# Patient Record
Sex: FEMALE | Race: WHITE | Employment: OTHER | URBAN - METROPOLITAN AREA
[De-identification: names, ages, dates, MRNs, and addresses within clinical notes are randomized per-mention and may not be internally consistent; named-entity substitution may affect disease eponyms.]

---

## 2021-08-31 ENCOUNTER — TRANSFERRED RECORDS (OUTPATIENT)
Dept: HEALTH INFORMATION MANAGEMENT | Facility: CLINIC | Age: 76
End: 2021-08-31

## 2022-01-17 ENCOUNTER — TRANSFERRED RECORDS (OUTPATIENT)
Dept: HEALTH INFORMATION MANAGEMENT | Facility: CLINIC | Age: 77
End: 2022-01-17
Payer: COMMERCIAL

## 2022-02-01 ENCOUNTER — TRANSFERRED RECORDS (OUTPATIENT)
Dept: HEALTH INFORMATION MANAGEMENT | Facility: CLINIC | Age: 77
End: 2022-02-01
Payer: COMMERCIAL

## 2022-02-03 ENCOUNTER — TELEPHONE (OUTPATIENT)
Dept: HEMATOLOGY | Facility: CLINIC | Age: 77
End: 2022-02-03
Payer: COMMERCIAL

## 2022-02-03 NOTE — CONFIDENTIAL NOTE
RN received a phone call from Eva's daughter Shereen. Per Shereen Eva had a stroke in 2017 and has since has many TIA's. In the last two weeks she was in the ER 2x. She was recently diagnosed with a BRVO and told to follow up with hematology for a hypercoagulable workup. Of note she is not currently on a blood thinner and she has hx of a Fib (has a watchman device).    RN unable to see these records in her chart or through care everywhere. RN will ask fellow nursing staff to reach out to opthamology office for records. Once records are obtained we will reach out to Eva to schedule.     Penelope GOLDBERG, RN, PHN  [facility]  [facility]  Office: [phone]  Fax: [phone]

## 2022-02-07 ENCOUNTER — MEDICAL CORRESPONDENCE (OUTPATIENT)
Dept: HEALTH INFORMATION MANAGEMENT | Facility: CLINIC | Age: 77
End: 2022-02-07
Payer: COMMERCIAL

## 2022-02-17 ENCOUNTER — TELEPHONE (OUTPATIENT)
Dept: HEMATOLOGY | Facility: CLINIC | Age: 77
End: 2022-02-17
Payer: COMMERCIAL

## 2022-02-23 NOTE — PROGRESS NOTES
"      [facility]  [address] [place] [address] [address], [place] [address]  Main: [phone], Fax: [phone]    Patient seen at: [facility] at [address] [place] [address]    Video Virtual Visit Note:    Patient: Eva Mosqueda  MRN: [id number]  : 1945  LUCY: 2022    Due to the ongoing COVID-19 outbreak, this visit was conducted by video, with the patient's approval.      Daughter, Shereen, was also on the video call     Reason for Consultation:  Eva Mosqueda is a referred by Amina Sherman PA-C for \"evaluation of clotting disorder, should she continue aspirin or other blood thinner?\"     Assessment:  In summary, Eva Mosqueda is a 77 year old man with a history of hypertension, cervical spinal fusion, hypothyroidism, varicosities, paroxysmal atrial fibrillation, intracranial hemorrhage on Xarelto, s/p Watchman, amyloid angiopathy with recurrent subarachnoid hemorrhages. She also has had recurrent TIA like episodes that were felt to be related to seizure like activity in setting of microhemorrhages secondary to her amyloid angiopathy. More recently she has been evaluated by her ophthalmologist for vision changes of the right eye with papilledema that was concerning for possible BRVO. She was placed back on aspirin. She has no personal or family history of venous or arterial thromboembolism. Her bleeding episodes of epistaxis, hemorrhoidal bleeding, ICH occurred in the setting of anticoagulation and antiplatelet use. She does have amyloid angiopathy and has had recurrent subarachnoid hemorrhages despite being off aspirin. She is currently back on aspirin and tolerating it well with reduction in her ER visits for TIA like symptoms. She is being seen by a retinal specialist for possible BRVO and reported retinal hemorrhage.     Plan:  Majority of today's visit was spent counseling the patient on the risks and benefits of antiplatelet and " anticoagulant use given her complex history.  She has no personal history of prior venous of arterial thromboembolism and has low pretest probability of a genetic thrombophilia. I do not recommend evaluation for these as she is not a candidate for full anticoagulation given her bleeding history and amyloid angiopathy. Her INR/PTT are within normal range. She does have strong evidence of risk factors for BRVO including hypertension and dyslipidemia. We discussed ongoing risk factor modification for these. I will defer the recommendation for continuation of aspirin for possible BRVO to her retinal specialist. It is reasonable to continue baby aspirin as long as the benefits are felt to outweigh the bleeding risks. This should be re-evaluated on an ongoing basis by her ophthalmologist, retinal specialist and neurologist.      The patient is given our center's contact information and is instructed to call if she should have any further questions or concerns.      Patient understands and agrees with the above plan and recommendation.      Gregoria Estevez, MPH, PA-C  [facility]    Video-Visit Details:  Type of service:  Video Visit  Video Start Time: 1500  Video End Time (time video stopped): 1525  Originating Location (pt. Location): Home  Distant Location (provider location):  [facility]   Mode of Communication:  Video Conference via [email or web address]    60 minutes spent on the date of the encounter doing chart review, review of outside records, review of test results, interpretation of tests, patient visit and documentation.      ----------------------------------------------------------------------------------------------------------------------  History of Present Illness:  Eva Mosqueda is a 77 year old woman with a history of hypertension, cervical spinal fusion, hypothyroidism, varicosities, paroxysmal  atrial fibrillation, bradycardia, intracranial hemorrhage on Xarelto, amyloid angiopathy with recurrent subarachnoid hemorrhages. She also has had recurrent TIA like episodes that were felt to be related to seizure like activity in setting of microhemorrhages secondary to her amyloid angiopathy. More recently she has been evaluated by her ophthalmologist for vision changes of the right eye with papilledema that was concerning for possible BRVO. She is referred by her primary care provider to assess need for coagulation evaluation and whether she should continue aspirin or other anticoagulant given her possible BRVO.    Ms. Mosqueda has a history of intracranial hemorrhage in right temporal region in setting of encephalomalacia and while on Xarelto in 2/2017. She had been changed from Eliquis to Xarelto just prior to the event due to insurance coverage. She was on anticoagulation for stroke prophylaxis given her atrial fibrillation. Once stabilized, she was placed back on Eliquis. Her follow up head MRI showed multiple areas of bleeding concerning for amyloid angiopathy per her neurologist, Dr. Ann. She was taken off her Eliquis and started on full dose aspirin in 4/2017. She did undergo Watchman implant in 4/2017 at [facility] and was on aspirin and plavix for 6 months followed by full dose aspirin only. In 2017, she had ER visit for epistaxis and hemoptysis on aspirin. In 6/2019, she was seen at the ER for TIA like episode of left arm and perioral paresthesias that resolved within an hour. She was seen by neurology in follow up and changed from full dose aspirin to Plavix. She did have some rectal bleeding on her plavix in 6/2019 and 12/2019 which was felt to be hemorrhoidal.      On 5/5/2021, she was seen for right hand numbness, tongue numbness and dysarthria. MRI at that time showed new left frontoparietal bleeding. Her plavix was discontinued. She was seen again on 5/8/2021 for recurrent symptoms. She was seen by  "neurology and was started on Keppra for concern of focal seizures causing her symptoms. She was tapered off Keppra after no symptomatic improvement was noted. She had repeat ER visits on 6/7/2021 and 6/8/2021 for similar symptoms. MRI showed chronic micro-hemorrhages consistent with amyloid angiopathy. Her symptoms resolved quickly and thus was discharged to home.     On 1/17/2022, she was seen by the ophthalmologist for right eye vision changes. She was found to have papilledema in right eye > left eye concerning for possible vascular cause. Per the ophthalmologist notes - \" could be impending CRVO or ischemic optic neuropathy vs other causes\". She was sent to the ER to rule out giant cell arteritis. Her ESR and CRP were normal. MRA showed no evidence of CNS vasculitis. She was incidentally found to have a meningioma without mass effect. Her ophthalmologist had recommended retinal imaging however the patient has severe contrast allergy and is unable to have that imaging.     On 1/24/2022, she called EMS for headache, slurred speech and word finding. Her speech deficit symptoms resolved while en route to the hospital. She had repeat brain MRI showing no acute findings. Neurology was consulted and recommended restarting Keppra however the patient declined. Headaches were attributed to cervical spine degenerative disease.     She met with her neurologist, Dr. Ann on 2/7/2022 in follow up who was agreeable to continue her on aspirin 81mg once daily despite higher bleeding risk in setting of amyloid angiopathy.     She recently established care with Dr. Rossi, retinal specialist, for her possible BRVO. She notes that she was also diagnosed with retinal hemorrhage and has been receiving injection therapy.  She was started on gabapentin for her headaches which has helped. She has not had any recent visits to the ER with any TIA-like symptoms.     She is currently on aspirin 81mg once daily and having some mild " ecchymosis but no significant bleeding. She has no history of significant bleeding events not related to being on anticoagulation or antiplatelet therapy. She has no family history of bleeding diathesis.     She does have a history of varicosities and an episode of superficial thrombophlebitis in 2015. She has no other history of venous or arterial thrombotic events.  No family history of venous thromboembolism. She does have a history of hypertension and takes lisinopril. She is on atorvastatin for dyslipidemia. She recently had MRA of the neck that showed mild atherosclerosis without stenosis.       Past Medical History:  Past Medical History:   Diagnosis Date     Arthritis      Hypertension      Irregular heart beat     afib 2003 now NSR     Numbness and tingling     down arms into hands-jack     Restless legs syndrome (RLS)      Thyroid disease        Past Surgical History:  Past Surgical History:   Procedure Laterality Date     BACK SURGERY      2 separate lumbar spine surgeries     ENT SURGERY      vocal cords     FUSION CERVICAL ANTERIOR TWO LEVELS  7/10/2012    Procedure: FUSION CERVICAL ANTERIOR TWO LEVELS;  C6 CORPECTOMY, C5-C7 ANTERIOR CERVICAL FUSION WITH PROSTHETIC INTERVERTEBRAL SPACER, LOCAL AUTOGRAFT AND INFUSE (PSR SPACER, ATLANTIS T, MIDAS AM8);  Surgeon: Antoine Corea MD;  Location:  OR       Medications:  Current Outpatient Medications   Medication Sig Dispense Refill     ASPIRIN PO Take 325 mg by mouth daily.         atorvastatin (LIPITOR) 10 MG tablet Take 10 mg by mouth daily       citalopram (CELEXA) 20 MG tablet Take 20 mg by mouth daily       gabapentin (NEURONTIN) 300 MG capsule Take 300 mg by mouth 2 times daily       LEVOTHYROXINE SODIUM PO Take 75 mcg by mouth daily.         lisinopril (ZESTRIL) 5 MG tablet Take 5 mg by mouth daily       METOPROLOL SUCCINATE PO Take 12.5 mg by mouth daily.         Pramipexole Dihydrochloride (MIRAPEX PO) Take 0.125 mg by mouth At Bedtime.          calcium carbonate-vitamin D (CALCIUM + D) 600-200 MG-UNIT TABS Take 2 tablets by mouth daily.         cholecalciferol (VITAMIN D) 1000 UNIT tablet Take 2 tablets by mouth daily. 100 tablet 3     FLECAINIDE ACETATE PO Take 50 mg by mouth 2 times daily.         Multiple Vitamin (MULTI-VITAMIN) per tablet Take 1 tablet by mouth daily.         Omega-3 Fatty Acids (OMEGA-3 FISH OIL PO) Take 1 g by mouth daily.         oxyCODONE-acetaminophen (PERCOCET) 5-325 MG per tablet Take 1 tablet by mouth every 4 hours as needed. (Patient not taking: Reported on 2022) 30 tablet           Allergies:  Allergies   Allergen Reactions     Contrast Dye Anaphylaxis       ROS:  Remainder of a comprehensive 14 point ROS is negative unless noted above.    Social History:  Patient is retired  Former tobacco use, quit officially in      Family History:  No family history of bleeding or clotting disorders.       Objective:  Pleasant in no acute distress.  Visual Examination via Video:      Labs:          Imagin/15/2022 MRA Cervical vessels  No change since May 2021. Mild atherosclerotic irregularity along the proximal internal carotid arteries without associated stenosis. Patent vertebral arteries with antegrade flow direction.        2022 US carotids    1.  Tortuous cervical internal carotid arteries, bilaterally.   2.  Velocities and ratios suggest moderate, 50-69% stenoses of the internal carotid arteries, bilaterally. However, the severity of the stenoses is likely exaggerated due to the tortuosity of the cervical segments.       2022 MRI Brain W/O  1. No acute intracranial abnormality evident.   2. Small right anterior right parafalcine meningioma unchanged.   3. 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 Multiple peripheral foci of chronic microhemorrhage suggest the possibility of underlying amyloid angiopathy as a group unchanged.   4. Focal area of chronic encephalomalacia and  gliosis in the right posterior temporal lobe unchanged.   5. Mild diffuse parenchymal volume loss similar to prior exam. No hydrocephalus. Mild to moderate chronic microvascular ischemic change similar to prior exam.        1/24/2022 CT Head W/O  No acute CT findings are seen. Other CT findings, as described above, are similar to the previous exam. If there is clinical concern for an acute or subacute stroke, and if additional imaging is desired, an MRI with diffusion-weighted imaging could be obtained.       1/17/2022 MRI Brain/Orbits w or w/o  IMPRESSION:   1.  Incidental small right anterior frontal convexity meningioma adjacent to the falx, with mild localized mass effect but no parenchymal edema. Better visualized with IV contrast administration but grossly stable in the interim.   2.  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 Question underlying cerebral amyloid angiopathy.   3.  Mild right posterior temporal cortical chronic encephalomalacia with subcortical T2 gliosis, stable.   4.  Mild-to-moderate nonspecific but presumed chronic microvascular disease in the cerebral white matters, stable.   5.  Unremarkable MRI of the orbits, without evidence to suggest optic neuritis on either side.   6.  No acute intracranial abnormality.       1/17/2022 Head MRA   1.  Normal cerebral MR angiogram. No MR angiographic evidence to suggest CNS vasculitis. Stable.     6/8/2021 Brain MRI   1. No acute intracranial abnormality evident.   2 mild diffuse parenchymal volume loss and mild chronic microvascular ischemic change. No hydrocephalus.   3. Chronic hemosiderin deposition along the sulci of both cerebral hemispheres suggestive of prior hemorrhage. Chronic microhemorrhages also seen along the periphery of both cerebral hemispheres. Findings are nonspecific but  suggest the possibility of amyloid angiopathy in patient of this age.       Head CT 5/8/2021:  IMPRESSION:   1. Stable small volume subarachnoid hemorrhage over the left frontal convexity. No new or extra-axial hemorrhage.  2. Encephalomalacia in the right posterolateral temporal lobe, unchanged.  3. Mild diffuse parenchymal volume loss and chronic white matter microangiopathy, unchanged.       Head CT 5/6/2021:   IMPRESSION:   1.  Subarachnoid (less likely cortically-based) hemorrhage along the left frontoparietal convexity, unchanged.   2.  Chronic encephalomalacia in the right posterolateral temporal lobe, unchanged.   3.  No new bleed or new intracranial abnormality, as compared with 5/5/2021.      Head CT 5/5/2021:  IMPRESSION:   1. Hemorrhage without edema or mass effect is seen in the cortex and subcortical white matter in the left frontal parietal region. This likely represents hemorrhagic infarct.  2. Focal encephalomalacia in the posterolateral right temporal lobe is stable.  3. No midline shift is seen.    MRI/MRA Brain w/o & MRA Carotid 5/5/2021:  IMPRESSION:  1. Subarachnoid blood products in the left frontoparietal convexity as seen on CT.   2. No acute underlying ischemic process identified.  3. Hemosiderin compatible with evidence of prior subarachnoid blood products bilaterally. These are similar to the prior study.  4. In the setting of multiple hemorrhagic foci of varying ages, amyloid angiopathy could be considered.  5. Encephalomalacia compatible with prior ischemic process in the right temporal region.  6. No acute intracranial vascular abnormality.  7. Involutional and microangiopathic changes.  8. No significant carotid or vertebral disease.      2/2017 CTA   IMPRESSION:   1.  Normal CT angiogram of the [place], without aneurysm or vascular malformation to accounting for the right medial temporal parenchymal hemorrhage. Stable.    2/19/2017 CT head   IMPRESSION:   1.  Small  intraparenchymal hemorrhage at the medial aspect of the right temporal lobe shows marginal increase in size compared to yesterday, now measuring 13 mm, compared to 12 mm yesterday, without mass effect.      2/18/2017 CT Head   IMPRESSION:     1.  1.1 cm round area of intraparenchymal hemorrhage in the inferior medial right temporal lobe.     1/20/2017     1.  1. No acute intracranial abnormality. Small area of apparent chronic encephalomalacia lateral aspect right temporal lobe. This could be related to previous trauma. Scattered foci of signal void in both cerebral hemispheres consistent with hemosiderin from previous intracranial injury and possible old subarachnoid hemorrhage.     2. Negative MR angiogram of the [place].     3. Negative MR angiogram of the carotid and vertebral arteries.

## 2022-02-28 ENCOUNTER — VIRTUAL VISIT (OUTPATIENT)
Dept: HEMATOLOGY | Facility: CLINIC | Age: 77
End: 2022-02-28
Attending: PHYSICIAN ASSISTANT
Payer: COMMERCIAL

## 2022-02-28 DIAGNOSIS — I68.0 CEREBRAL AMYLOID ANGIOPATHY (H): ICD-10-CM

## 2022-02-28 DIAGNOSIS — H34.8310 BRANCH RETINAL VEIN OCCLUSION OF RIGHT EYE WITH MACULAR EDEMA (H): Primary | ICD-10-CM

## 2022-02-28 DIAGNOSIS — E85.4 CEREBRAL AMYLOID ANGIOPATHY (H): ICD-10-CM

## 2022-02-28 DIAGNOSIS — Z86.79 HISTORY OF INTRACRANIAL HEMORRHAGE: ICD-10-CM

## 2022-02-28 PROCEDURE — 99205 OFFICE O/P NEW HI 60 MIN: CPT | Mod: 95 | Performed by: PHYSICIAN ASSISTANT

## 2022-02-28 RX ORDER — CITALOPRAM HYDROBROMIDE 20 MG/1
20 TABLET ORAL DAILY
COMMUNITY

## 2022-02-28 RX ORDER — ATORVASTATIN CALCIUM 10 MG/1
10 TABLET, FILM COATED ORAL DAILY
COMMUNITY

## 2022-02-28 RX ORDER — GABAPENTIN 300 MG/1
300 CAPSULE ORAL 2 TIMES DAILY
COMMUNITY

## 2022-02-28 RX ORDER — LISINOPRIL 5 MG/1
5 TABLET ORAL DAILY
COMMUNITY

## 2022-02-28 NOTE — PROGRESS NOTES
Patient was contacted to complete the pre-visit call prior to their video visit with the provider.      Allergies and medications were reviewed.    I thanked them for their time to cover this information     Bindu Khan   Program Registrar   [facility]  [phone]

## 2022-03-05 ENCOUNTER — HEALTH MAINTENANCE LETTER (OUTPATIENT)
Age: 77
End: 2022-03-05

## 2022-11-20 ENCOUNTER — HEALTH MAINTENANCE LETTER (OUTPATIENT)
Age: 77
End: 2022-11-20

## 2023-04-15 ENCOUNTER — HEALTH MAINTENANCE LETTER (OUTPATIENT)
Age: 78
End: 2023-04-15

## 2024-06-22 ENCOUNTER — HEALTH MAINTENANCE LETTER (OUTPATIENT)
Age: 79
End: 2024-06-22

## 2025-08-06 ENCOUNTER — TRANSCRIBE ORDERS (OUTPATIENT)
Dept: OTHER | Age: 80
End: 2025-08-06

## 2025-08-06 DIAGNOSIS — R26.81 GAIT INSTABILITY: Primary | ICD-10-CM
